# Patient Record
Sex: FEMALE | Race: WHITE
[De-identification: names, ages, dates, MRNs, and addresses within clinical notes are randomized per-mention and may not be internally consistent; named-entity substitution may affect disease eponyms.]

---

## 2019-03-18 ENCOUNTER — HOSPITAL ENCOUNTER (EMERGENCY)
Dept: HOSPITAL 25 - ED | Age: 24
Discharge: HOME | End: 2019-03-18
Payer: COMMERCIAL

## 2019-03-18 VITALS — SYSTOLIC BLOOD PRESSURE: 99 MMHG | DIASTOLIC BLOOD PRESSURE: 64 MMHG

## 2019-03-18 DIAGNOSIS — R10.31: ICD-10-CM

## 2019-03-18 DIAGNOSIS — N83.209: Primary | ICD-10-CM

## 2019-03-18 DIAGNOSIS — F17.210: ICD-10-CM

## 2019-03-18 DIAGNOSIS — R11.0: ICD-10-CM

## 2019-03-18 LAB
ALBUMIN SERPL BCG-MCNC: 4 G/DL (ref 3.2–5.2)
ALBUMIN/GLOB SERPL: 1.5 {RATIO} (ref 1–3)
ALP SERPL-CCNC: 69 U/L (ref 34–104)
ALT SERPL W P-5'-P-CCNC: 14 U/L (ref 7–52)
ANION GAP SERPL CALC-SCNC: 7 MMOL/L (ref 2–11)
AST SERPL-CCNC: 16 U/L (ref 13–39)
BASOPHILS # BLD AUTO: 0 10^3/UL (ref 0–0.2)
BUN SERPL-MCNC: 13 MG/DL (ref 6–24)
BUN/CREAT SERPL: 19.7 (ref 8–20)
CALCIUM SERPL-MCNC: 9 MG/DL (ref 8.6–10.3)
CHLORIDE SERPL-SCNC: 108 MMOL/L (ref 101–111)
EOSINOPHIL # BLD AUTO: 0.2 10^3/UL (ref 0–0.6)
GLOBULIN SER CALC-MCNC: 2.6 G/DL (ref 2–4)
GLUCOSE SERPL-MCNC: 84 MG/DL (ref 70–100)
HCG SERPL QL: < 0.6 MIU/ML
HCO3 SERPL-SCNC: 25 MMOL/L (ref 22–32)
HCT VFR BLD AUTO: 43 % (ref 33–41)
HGB BLD-MCNC: 14.4 G/DL (ref 12–16)
LYMPHOCYTES # BLD AUTO: 3.2 10^3/UL (ref 1–4.8)
MCH RBC QN AUTO: 30 PG (ref 27–31)
MCHC RBC AUTO-ENTMCNC: 34 G/DL (ref 31–36)
MCV RBC AUTO: 88 FL (ref 80–97)
MONOCYTES # BLD AUTO: 0.5 10^3/UL (ref 0–0.8)
NEUTROPHILS # BLD AUTO: 4.4 10^3/UL (ref 1.5–7.7)
NRBC # BLD AUTO: 0 10^3/UL
NRBC BLD QL AUTO: 0.1
PLATELET # BLD AUTO: 184 10^3/UL (ref 150–450)
POTASSIUM SERPL-SCNC: 3.9 MMOL/L (ref 3.5–5)
PROT SERPL-MCNC: 6.6 G/DL (ref 6.4–8.9)
RBC # BLD AUTO: 4.83 10^6 /UL (ref 3.7–4.87)
SODIUM SERPL-SCNC: 140 MMOL/L (ref 135–145)
WBC # BLD AUTO: 8.4 10^3/UL (ref 3.5–10.8)

## 2019-03-18 PROCEDURE — 83690 ASSAY OF LIPASE: CPT

## 2019-03-18 PROCEDURE — 81003 URINALYSIS AUTO W/O SCOPE: CPT

## 2019-03-18 PROCEDURE — 36415 COLL VENOUS BLD VENIPUNCTURE: CPT

## 2019-03-18 PROCEDURE — 96376 TX/PRO/DX INJ SAME DRUG ADON: CPT

## 2019-03-18 PROCEDURE — 96375 TX/PRO/DX INJ NEW DRUG ADDON: CPT

## 2019-03-18 PROCEDURE — 84702 CHORIONIC GONADOTROPIN TEST: CPT

## 2019-03-18 PROCEDURE — 99284 EMERGENCY DEPT VISIT MOD MDM: CPT

## 2019-03-18 PROCEDURE — 80053 COMPREHEN METABOLIC PANEL: CPT

## 2019-03-18 PROCEDURE — 76705 ECHO EXAM OF ABDOMEN: CPT

## 2019-03-18 PROCEDURE — 86140 C-REACTIVE PROTEIN: CPT

## 2019-03-18 PROCEDURE — 74177 CT ABD & PELVIS W/CONTRAST: CPT

## 2019-03-18 PROCEDURE — 96374 THER/PROPH/DIAG INJ IV PUSH: CPT

## 2019-03-18 PROCEDURE — 85025 COMPLETE CBC W/AUTO DIFF WBC: CPT

## 2019-03-18 NOTE — XMS REPORT
Continuity of Care Document (CCD)

 Created on:2019



Patient:Bailey Harrington

Sex:Female

:1995

External Reference #:[id number]





Demographics







 Address  [address] [place] [address]

 

 Home Phone  [phone]

 

 Mobile Phone  [phone]

 

 Preferred Language  en

 

 Marital Status  Not  or 

 

 Church Affiliation  Unknown

 

 Race  White

 

 Ethnic Group  Not  or 









Author







 Name  Makenzie Lambert









Support







 Name  Relationship  Address  Phone

 

 Biju Morgan  Significant Other  Unavailable  [phone]









Care Team Providers







 Name  Role  Phone

 

 Maria Fernanda Higginbotham PA  Primary Care Physician  Unavailable









Payers







 Date  Identification Numbers  Payment Provider  Subscriber

 

   Policy Number: [id number]  [employer]  Bailey Harrington









 PayID: [id number]  [address] [address]









 [place] [address]







Advance Directives







 Description

 

 No Information Available







Problems







 Date  Description  Provider  Status

 

 Onset: 2018  Epilepsy  Maria Fernanda Yip  Active



     PA  

 

 Onset: 2018  Bipolar disorder  Maria Fernanda Yip  Active



     PA  

 

 Onset: 10/24/2018  Epilepsy characterized by  Goldy Brizuela MD  Active



   intractable complex partial    



   seizures    







Family History







 Description

 

 No Information Available







Social History







 Type  Date  Description  Comments

 

 Birth Sex    Unknown  

 

 ETOH Use    Rarely consumes alcohol  

 

 Tobacco Use  Start: Unknown  Heavy tobacco smoker (more than 10  



     cigarettes/day)  

 

 Smoking Status  Reviewed: 19  Heavy tobacco smoker (more than 10  



     cigarettes/day)  







Allergies, Adverse Reactions, Alerts







 Date  Description  Reaction  Status  Severity  Comments

 

 10/24/2018  Dayquil Cough & Congestion    Active    

 

 10/24/2018  NKDA    Inactive    







Medications







 Medication  Date  Status  Form  Strength  Qnty  SIG  Indications  Ordering



                 Provider

 

 Vimpat  20  Active  Tablets  100mg  120tabs  2 by mouth  G40.909  Goldy



   19          twice a    MD Chata



             day    

 

 IUD    Active        birth    Unknown



   00          control    

 

 Ibuprofen    Active  Capsules  200mg    as needed    Unknown



   00              

 

                 

 

 Vimpat  20  Hx  Tablets  50mg  90tabs  1 in the  G40.219  Goldy



   18 -          morning    MD Chata



   20          and 2 at    



   19          night    

 

 Vimpat  10/24/20  Hx  Tablets  50mg  120tabs  1 in in  G40.219  Goldy



   18 -          the    MD Chata



   20          morning    



   18          for 1 week    



             then 1 in    



             in the    



             morning    



             and 1 in    



             at night    



             for a week    



             then 1 in    



             in the    



             morning    



             and 2 at    



             night    

 

 Vimpat  20  Hx  Tablets  100mg  60tabs  1 tablet  G40.909  Goldy



   18 -          by mouth    MD Chata



   20          twice    



   19          daily    







Immunizations







 Description

 

 No Information Available







Vital Signs







 Date  Vital  Result  Comment

 

 2019 10:25am  Height  65 inches  5'5"









 Weight  152.00 lb  

 

 Heart Rate  66 /min  

 

 BP Systolic  122 mmHg  

 

 BP Diastolic  70 mmHg  

 

 BMI (Body Mass Index)  25.3 kg/m2  









 10/24/2018  3:19pm  Height  65 inches  5'5"









 Weight  155.00 lb  

 

 Heart Rate  62 /min  

 

 BP Systolic  106 mmHg  

 

 BP Diastolic  64 mmHg  

 

 BMI (Body Mass Index)  25.8 kg/m2  







Results







 Description

 

 No Information Available







Procedures







 Date  Code  Description  Status

 

 10/26/2018  27904  EEG Recording Awake & Drowsy  Completed







Encounters







 Type  Date  Location  Provider  Dx  Diagnosis

 

 Office Visit  10/24/2018  [facility]  Goldy Brizuela  G40.219  
Local-rel



   3:00p    MD    symptc epi w



           cmplx part



           seiz, ntrct,



           w/o stat epi







Plan of Treatment

Future Appointment(s):2019  4:00 pm - Goldy Brizuela MD at 
[id number] - Goldy Brizuela MDG40.219 Localization-
related (focal) (partial) symptomatic epilepsyComments:Discussed with her that 
she has difficult to control seizures most likely focal  and will increase 
vimpat to 200mg twice a day but am concerned that she will have perhaps worse 
side effects with this.If this happens will perhaps try trileptal next and 
discussed that I think there is a fairly good chance that she will not get 
complete seizure control on medication and that we need to prove what typeof 
seizures she is having and where they are coming from and will speak to Dr Erazo arrange for video eeg and to see DR Erazo.  If the seizures are not 
coming from speech area she may e a candidate forsurgery in the next year or 
two if medications do not work.Follow up:3 MONTHS

## 2019-03-18 NOTE — ED
GI/ HPI





- HPI Summary


HPI Summary: 


23 year old female presents with right lower quadrant pain for the past couple 

days.  She started in right upper quadrant and moved to right lower quadrant.  

She admits occasional nausea but no vomiting.  She did have looser stools.  No 

flank pain.  No urinary symptoms.  Denies any abnormal vaginal discharge.  She 

states that she had a low-grade fever.  States her symptoms are getting worse 

for past day.  Pain is worse with movement.  She hasn't taking anything for her 

symptoms.  Has no medical conditions.  No previous belly surgery.  





- History of Current Complaint


Chief Complaint: EDAbdPain


Time Seen by Provider: 03/18/19 18:19


Stated Complaint: EXTREME PAIN IN RIGHT QUADRANT PER PT


Hx Last Menstrual Period: one month ago


Pain Intensity: 9





- Allergy/Home Medications


Allergies/Adverse Reactions: 


 Allergies











Allergy/AdvReac Type Severity Reaction Status Date / Time


 


No Known Allergies Allergy   Verified 03/18/19 15:02











Home Medications: 


 Home Medications





Lacosamide TAB* [Vimpat TAB*] 400 mg PO BID 03/18/19 [History Confirmed 03/18/19

]











PMH/Surg Hx/FS Hx/Imm Hx


Endocrine/Hematology History: 


   Denies: Hx Diabetes


Cardiovascular History: 


   Denies: Hx Hypertension


 History: 


   Denies: Hx Renal Disease


Infectious Disease History: No


Infectious Disease History: 


   Denies: Traveled Outside the [place] in Last 30 Days





- Family History


Known Family History: Positive: Non-Contributory





- Social History


Alcohol Use: None


Substance Use Type: Reports: None


Smoking Status (MU): Light Every Day Tobacco Smoker





Review of Systems


Negative: Fever


Negative: Chest Pain


Negative: Shortness Of Breath


Positive: Abdominal Pain, Nausea.  Negative: Vomiting, Diarrhea


All Other Systems Reviewed And Are Negative: Yes





Physical Exam


Triage Information Reviewed: Yes


Vital Signs On Initial Exam: 


 Initial Vitals











Temp Pulse Resp BP Pulse Ox


 


 98 F   79   16   120/75   99 


 


 03/18/19 15:00  03/18/19 15:00  03/18/19 15:00  03/18/19 15:00  03/18/19 15:00











Vital Signs Reviewed: Yes


Appearance: Positive: Well-Appearing


Skin: Positive: Warm, Dry


Head/Face: Positive: Normal Head/Face Inspection


Eyes: Positive: Normal, Conjunctiva Clear


ENT: Positive: Pharynx normal


Respiratory/Lung Sounds: Positive: Clear to Auscultation, Breath Sounds Present


Cardiovascular: Positive: Normal, RRR


Abdomen Description: Positive: Soft, Other: - tenderness RLQ, neg rovsings


Bowel Sounds: Positive: Present


Musculoskeletal: Positive: Normal


Neurological: Positive: Normal


Psychiatric: Positive: Normal





Diagnostics





- Vital Signs


 Vital Signs











  Temp Pulse Resp BP Pulse Ox


 


 03/18/19 21:25   74   110/69  99


 


 03/18/19 21:06  98.3 F  73   100/75  98


 


 03/18/19 21:03   82    99


 


 03/18/19 20:25   66   94/57  98


 


 03/18/19 20:00   69    98


 


 03/18/19 19:55   64   102/64  97


 


 03/18/19 19:25   67   105/59  98


 


 03/18/19 19:00   72    99


 


 03/18/19 18:57   87    99


 


 03/18/19 18:55   72   115/57  99


 


 03/18/19 18:51    18  


 


 03/18/19 17:42  99.7 F  73  16  107/70  100


 


 03/18/19 15:00  98 F  79  16  120/75  99














- Laboratory


Lab Results: 


 Lab Results











  03/18/19 03/18/19 03/18/19 Range/Units





  18:27 18:27 18:45 


 


WBC  8.4    (3.5-10.8)  10^3/uL


 


RBC  4.83    (3.70-4.87)  10^6 /uL


 


Hgb  14.4    (12.0-16.0)  g/dL


 


Hct  43 H    (33-41)  %


 


MCV  88    (80-97)  fL


 


MCH  30    (27-31)  pg


 


MCHC  34    (31-36)  g/dL


 


RDW  14    (10.5-15)  %


 


Plt Count  184    (150-450)  10^3/uL


 


MPV  10.6 H    (7.4-10.4)  fL


 


Neut % (Auto)  52.6    %


 


Lymph % (Auto)  38.8    %


 


Mono % (Auto)  6.1    %


 


Eos % (Auto)  2.0    %


 


Baso % (Auto)  0.5    %


 


Absolute Neuts (auto)  4.4    (1.5-7.7)  10^3/ul


 


Absolute Lymphs (auto)  3.2    (1.0-4.8)  10^3/ul


 


Absolute Monos (auto)  0.5    (0-0.8)  10^3/ul


 


Absolute Eos (auto)  0.2    (0-0.6)  10^3/ul


 


Absolute Basos (auto)  0    (0-0.2)  10^3/ul


 


Absolute Nucleated RBC  0    10^3/ul


 


Nucleated RBC %  0.1    


 


Sodium   140   (135-145)  mmol/L


 


Potassium   3.9   (3.5-5.0)  mmol/L


 


Chloride   108   (101-111)  mmol/L


 


Carbon Dioxide   25   (22-32)  mmol/L


 


Anion Gap   7   (2-11)  mmol/L


 


BUN   13   (6-24)  mg/dL


 


Creatinine   0.66   (0.51-0.95)  mg/dL


 


Est GFR ( Amer)   136.8   (>60)  


 


Est GFR (Non-Af Amer)   113.1   (>60)  


 


BUN/Creatinine Ratio   19.7   (8-20)  


 


Glucose   84   ()  mg/dL


 


Calcium   9.0   (8.6-10.3)  mg/dL


 


Total Bilirubin   0.40   (0.2-1.0)  mg/dL


 


AST   16   (13-39)  U/L


 


ALT   14   (7-52)  U/L


 


Alkaline Phosphatase   69   ()  U/L


 


C-Reactive Protein   < 1.00   (<8.01)  mg/L


 


Total Protein   6.6   (6.4-8.9)  g/dL


 


Albumin   4.0   (3.2-5.2)  g/dL


 


Globulin   2.6   (2-4)  g/dL


 


Albumin/Globulin Ratio   1.5   (1-3)  


 


Lipase   17   (11.0-82.0)  U/L


 


Beta HCG, Quant   < 0.60   mIU/mL


 


Urine Color    Anila  


 


Urine Appearance    Turbid  


 


Urine pH    7.0  (5-9)  


 


Ur Specific Gravity    1.025  (1.010-1.030)  


 


Urine Protein    Negative  (Negative)  


 


Urine Ketones    Negative  (Negative)  


 


Urine Blood    Negative  (Negative)  


 


Urine Nitrate    Negative  (Negative)  


 


Urine Bilirubin    Negative  (Negative)  


 


Urine Urobilinogen    Negative  (Negative)  


 


Ur Leukocyte Esterase    Negative  (Negative)  


 


Urine Glucose    Negative  (Negative)  











Result Diagrams: 


 03/18/19 18:27





 03/18/19 18:27


Lab Statement: Any lab studies that have been ordered have been reviewed, and 

results considered in the medical decision making process.





- CT


  ** abd


CT Interpretation Completed By: Radiologist


Summary of CT Findings: IMPRESSION:  Right small follicular ovarian cysts. No 

additional findings to correlate with.  patient's symptomatology. Specifically 

no appendicitis.





- Ultrasound


  ** No standard instances


Ultrasound Interpretation Completed By: Radiologist


Summary of Ultrasound Findings: IMPRESSION:  #. Negative RIGHT upper quadrant 

ultrasound with incomplete distention of the gallbladder.  mildly limiting 

assessment.  #. Images through the RIGHT lower quadrant fail to demonstrate the 

appendix limiting.  assessment for appendicitis. No RIGHT lower quadrant free 

fluid or visible.  lymphadenopathy.





Re-Evaluation





- Re-Evaluation


  ** First Eval


Change: Improved


Comment: pain better





  ** Second Eval


Re-Evaluation Time: 22:12


Change: Unchanged


Comment: discussed getting an ultrasound again and patient declined, warned 

possibility missing ovarian torison and patient understand risk





GIGU Course/Dx





- Course


Course Of Treatment: 23 year old female presents with right lower quadrant pain 

for the past couple days.  She started in right upper quadrant and moved to 

right lower quadrant.  She admits occasional nausea but no vomiting.  She did 

have looser stools.  No flank pain.  No urinary symptoms.  Denies any abnormal 

vaginal discharge.  She states that she had a low-grade fever.  States her 

symptoms are getting worse for past day.  Pain is worse with movement.  She hasn

't taking anything for her symptoms.  Has no medical conditions.  No previous 

belly surgery.  On exam tenderness the right lower quadrant.  wbc normal.  CRP 

normal.  Urine normal.  gallbladder ultrasound normal. appendix ultrasound 

appendix not seen.  CT shows ovarian cyst. discussed getting transvaginal 

ultrasound and patient declined. told to take tyenlol or ibuprofen for pain. 

patient understand and agrees with plan.





- Diagnoses


Differential Diagnoses - Female: Appendicitis, Ovarian Cyst, Urinary Tract 

Infection


Provider Diagnoses: 


 Ovarian cyst








Discharge





- Sign-Out/Discharge


Documenting (check all that apply): Patient Departure


Patient Received Moderate/Deep Sedation with Procedure: No





- Discharge Plan


Condition: Good


Disposition: HOME


Patient Education Materials:  Ovarian Cyst (ED)


Referrals: 


Baylee Rainey PA [Primary Care Provider] - 


Additional Instructions: 


Take Tylenol or ibuprofen every 6 hours for pain


apply heat


Follow up with primary


Return to ED if develop any new or worsening symptoms





- Billing Disposition and Condition


Condition: GOOD


Disposition: Home